# Patient Record
Sex: FEMALE | Race: WHITE
[De-identification: names, ages, dates, MRNs, and addresses within clinical notes are randomized per-mention and may not be internally consistent; named-entity substitution may affect disease eponyms.]

---

## 2017-09-27 ENCOUNTER — HOSPITAL ENCOUNTER (OUTPATIENT)
Dept: HOSPITAL 53 - M LAB REF | Age: 22
End: 2017-09-27
Attending: ADVANCED PRACTICE MIDWIFE
Payer: COMMERCIAL

## 2017-09-27 DIAGNOSIS — Z12.4: Primary | ICD-10-CM

## 2018-03-25 ENCOUNTER — HOSPITAL ENCOUNTER (OUTPATIENT)
Dept: HOSPITAL 53 - M WUC | Age: 23
End: 2018-03-25
Attending: NURSE PRACTITIONER
Payer: COMMERCIAL

## 2018-03-25 DIAGNOSIS — R53.83: Primary | ICD-10-CM

## 2018-03-25 DIAGNOSIS — F41.9: ICD-10-CM

## 2018-03-25 LAB
ALBUMIN/GLOBULIN RATIO: 1.33 (ref 1–1.93)
ALBUMIN: 4 GM/DL (ref 3.2–5.2)
ALKALINE PHOSPHATASE: 71 U/L (ref 45–117)
ALT SERPL W P-5'-P-CCNC: 15 U/L (ref 12–78)
ANION GAP: 6 MEQ/L (ref 8–16)
AST SERPL-CCNC: 7 U/L (ref 7–37)
BASO #: 0 10^3/UL (ref 0–0.2)
BASO %: 0.3 % (ref 0–1)
BILIRUBIN,TOTAL: 0.4 MG/DL (ref 0.2–1)
BLOOD UREA NITROGEN: 16 MG/DL (ref 7–18)
CALCIUM LEVEL: 8.6 MG/DL (ref 8.5–10.1)
CARBON DIOXIDE LEVEL: 29 MEQ/L (ref 21–32)
CHLORIDE LEVEL: 108 MEQ/L (ref 98–107)
CREATININE FOR GFR: 0.77 MG/DL (ref 0.55–1.3)
EOS #: 0 10^3/UL (ref 0–0.5)
EOSINOPHIL NFR BLD AUTO: 0.5 % (ref 0–3)
GFR SERPL CREATININE-BSD FRML MDRD: > 60 ML/MIN/{1.73_M2} (ref 60–?)
GLUCOSE, FASTING: 84 MG/DL (ref 70–100)
HEMATOCRIT: 39.2 % (ref 36–47)
HEMOGLOBIN: 12.8 G/DL (ref 12–16)
IMMATURE GRANULOCYTE %: 0.3 % (ref 0–3)
IRON (FE): 51 UG/DL (ref 50–170)
LYMPH #: 0.8 10^3/UL (ref 1.5–6.5)
LYMPH %: 21.4 % (ref 24–44)
MEAN CORPUSCULAR HEMOGLOBIN: 31.7 PG (ref 27–33)
MEAN CORPUSCULAR HGB CONC: 32.7 G/DL (ref 32–36.5)
MEAN CORPUSCULAR VOLUME: 97 FL (ref 80–96)
MONO #: 0.5 10^3/UL (ref 0–0.8)
MONO %: 12.2 % (ref 0–5)
NEUTROPHILS #: 2.5 10^3/UL (ref 1.8–7.7)
NEUTROPHILS %: 65.3 % (ref 36–66)
NRBC BLD AUTO-RTO: 0 % (ref 0–0)
PLATELET COUNT, AUTOMATED: 187 10^3/UL (ref 150–450)
POTASSIUM SERUM: 4.2 MEQ/L (ref 3.5–5.1)
RED BLOOD COUNT: 4.04 10^6/UL (ref 4–5.4)
RED CELL DISTRIBUTION WIDTH: 12 % (ref 11.5–14.5)
SODIUM LEVEL: 143 MEQ/L (ref 136–145)
TOTAL PROTEIN: 7 GM/DL (ref 6.4–8.2)
WHITE BLOOD COUNT: 3.8 10^3/UL (ref 4–10)

## 2018-03-25 PROCEDURE — 83540 ASSAY OF IRON: CPT

## 2018-03-26 LAB
25(OH)D3 SERPL-MCNC: 17.8 NG/ML (ref 30–100)
VITAMIN B12 LEVEL: 231 PG/ML (ref 247–911)

## 2018-10-01 ENCOUNTER — HOSPITAL ENCOUNTER (OUTPATIENT)
Dept: HOSPITAL 53 - M LAB REF | Age: 23
End: 2018-10-01
Attending: PHYSICIAN ASSISTANT
Payer: COMMERCIAL

## 2018-10-01 DIAGNOSIS — N39.0: Primary | ICD-10-CM

## 2018-10-01 LAB
AMORPH SED URNS QL MICRO: (no result)
APPEARANCE, URINE: (no result)
BACTERIA UR QL AUTO: (no result)
BILIRUBIN, URINE AUTO: NEGATIVE
BLOOD, URINE BLOOD: (no result)
GLUCOSE, URINE (UA) AUTO: NEGATIVE MG/DL
KETONE, URINE AUTO: NEGATIVE MG/DL
LEUKOCYTE ESTERASE UR QL STRIP.AUTO: (no result)
MUCUS, URINE: (no result)
NITRITE, URINE AUTO: NEGATIVE
PH,URINE: 5 UNITS (ref 5–9)
PROT UR QL STRIP.AUTO: (no result) MG/DL
RBC, URINE AUTO: 21 /HPF (ref 0–3)
SPECIFIC GRAVITY URINE AUTO: 1.02 (ref 1–1.03)
SQUAMOUS #/AREA URNS AUTO: 20 /HPF (ref 0–6)
UROBILINOGEN, URINE AUTO: 0.2 MG/DL (ref 0–2)
WBC, URINE AUTO: (no result) /HPF (ref 0–3)

## 2018-10-01 PROCEDURE — 81001 URINALYSIS AUTO W/SCOPE: CPT

## 2018-12-08 ENCOUNTER — HOSPITAL ENCOUNTER (OUTPATIENT)
Dept: HOSPITAL 53 - M WUC | Age: 23
End: 2018-12-08
Attending: NURSE PRACTITIONER
Payer: COMMERCIAL

## 2018-12-08 DIAGNOSIS — E55.9: ICD-10-CM

## 2018-12-08 DIAGNOSIS — F41.9: ICD-10-CM

## 2018-12-08 DIAGNOSIS — E61.1: Primary | ICD-10-CM

## 2018-12-08 DIAGNOSIS — D51.9: ICD-10-CM

## 2018-12-08 LAB
ALBUMIN/GLOBULIN RATIO: 1.16 (ref 1–1.93)
ALBUMIN: 3.7 GM/DL (ref 3.2–5.2)
ALKALINE PHOSPHATASE: 60 U/L (ref 45–117)
ALT SERPL W P-5'-P-CCNC: 22 U/L (ref 12–78)
ANION GAP: 8 MEQ/L (ref 8–16)
AST SERPL-CCNC: 11 U/L (ref 7–37)
BILIRUBIN,TOTAL: 0.5 MG/DL (ref 0.2–1)
BLOOD UREA NITROGEN: 9 MG/DL (ref 7–18)
CALCIUM LEVEL: 9.5 MG/DL (ref 8.5–10.1)
CARBON DIOXIDE LEVEL: 28 MEQ/L (ref 21–32)
CHLORIDE LEVEL: 105 MEQ/L (ref 98–107)
CREATININE FOR GFR: 0.78 MG/DL (ref 0.55–1.3)
GFR SERPL CREATININE-BSD FRML MDRD: > 60 ML/MIN/{1.73_M2} (ref 60–?)
GLUCOSE, FASTING: 100 MG/DL (ref 70–100)
IRON (FE): 37 UG/DL (ref 50–170)
POTASSIUM SERUM: 3.8 MEQ/L (ref 3.5–5.1)
SODIUM LEVEL: 141 MEQ/L (ref 136–145)
TOTAL PROTEIN: 6.9 GM/DL (ref 6.4–8.2)

## 2018-12-08 PROCEDURE — 83540 ASSAY OF IRON: CPT

## 2018-12-10 LAB
25(OH)D3 SERPL-MCNC: 14.9 NG/ML (ref 30–100)
VITAMIN B12 LEVEL: 246 PG/ML (ref 247–911)

## 2019-09-05 ENCOUNTER — HOSPITAL ENCOUNTER (OUTPATIENT)
Dept: HOSPITAL 53 - M WUC | Age: 24
End: 2019-09-05
Attending: FAMILY MEDICINE
Payer: COMMERCIAL

## 2019-09-05 DIAGNOSIS — D51.9: ICD-10-CM

## 2019-09-05 DIAGNOSIS — E55.9: Primary | ICD-10-CM

## 2019-09-05 DIAGNOSIS — E61.1: ICD-10-CM

## 2019-09-05 DIAGNOSIS — R63.5: ICD-10-CM

## 2019-09-05 LAB
25(OH)D3 SERPL-MCNC: 34.3 NG/ML (ref 30–100)
ALBUMIN SERPL BCG-MCNC: 3.8 GM/DL (ref 3.2–5.2)
ALT SERPL W P-5'-P-CCNC: 17 U/L (ref 12–78)
BILIRUB SERPL-MCNC: 0.4 MG/DL (ref 0.2–1)
BUN SERPL-MCNC: 14 MG/DL (ref 7–18)
CALCIUM SERPL-MCNC: 9.3 MG/DL (ref 8.5–10.1)
CHLORIDE SERPL-SCNC: 107 MEQ/L (ref 98–107)
CO2 SERPL-SCNC: 26 MEQ/L (ref 21–32)
CREAT SERPL-MCNC: 0.7 MG/DL (ref 0.55–1.3)
GFR SERPL CREATININE-BSD FRML MDRD: > 60 ML/MIN/{1.73_M2} (ref 60–?)
GLUCOSE SERPL-MCNC: 82 MG/DL (ref 70–100)
HCT VFR BLD AUTO: 38.9 % (ref 36–47)
HGB BLD-MCNC: 13.3 G/DL (ref 12–15.5)
IRON SATN MFR SERPL: 31.4 % (ref 13.2–45)
IRON SERPL-MCNC: 106 UG/DL (ref 50–170)
MCH RBC QN AUTO: 32.6 PG (ref 27–33)
MCHC RBC AUTO-ENTMCNC: 34.2 G/DL (ref 32–36.5)
MCV RBC AUTO: 95.3 FL (ref 80–96)
PLATELET # BLD AUTO: 227 10^3/UL (ref 150–450)
POTASSIUM SERPL-SCNC: 4 MEQ/L (ref 3.5–5.1)
PROT SERPL-MCNC: 6.8 GM/DL (ref 6.4–8.2)
RBC # BLD AUTO: 4.08 10^6/UL (ref 4–5.4)
SODIUM SERPL-SCNC: 140 MEQ/L (ref 136–145)
TIBC SERPL-MCNC: 338 UG/DL (ref 250–450)
TSH SERPL DL<=0.005 MIU/L-ACNC: 2.19 UIU/ML (ref 0.36–3.74)
VIT B12 SERPL-MCNC: 225 PG/ML (ref 247–911)
WBC # BLD AUTO: 4.7 10^3/UL (ref 4–10)

## 2019-10-11 ENCOUNTER — HOSPITAL ENCOUNTER (OUTPATIENT)
Dept: HOSPITAL 53 - M LAB REF | Age: 24
End: 2019-10-11
Attending: ADVANCED PRACTICE MIDWIFE
Payer: COMMERCIAL

## 2019-10-11 DIAGNOSIS — R87.610: Primary | ICD-10-CM

## 2019-10-11 PROCEDURE — 87624 HPV HI-RISK TYP POOLED RSLT: CPT

## 2019-10-15 LAB — HPV LOW VOL RFLX: (no result)

## 2019-11-15 ENCOUNTER — HOSPITAL ENCOUNTER (OUTPATIENT)
Dept: HOSPITAL 53 - M LAB REF | Age: 24
End: 2019-11-15
Payer: COMMERCIAL

## 2019-11-15 DIAGNOSIS — R87.820: ICD-10-CM

## 2019-11-15 DIAGNOSIS — R87.610: Primary | ICD-10-CM

## 2020-05-22 ENCOUNTER — HOSPITAL ENCOUNTER (OUTPATIENT)
Dept: HOSPITAL 53 - M SFHCWAGY | Age: 25
End: 2020-05-22
Attending: ADVANCED PRACTICE MIDWIFE
Payer: COMMERCIAL

## 2020-05-22 DIAGNOSIS — R87.612: Primary | ICD-10-CM

## 2020-05-22 PROCEDURE — 87624 HPV HI-RISK TYP POOLED RSLT: CPT

## 2021-02-25 ENCOUNTER — HOSPITAL ENCOUNTER (OUTPATIENT)
Dept: HOSPITAL 53 - M WUC | Age: 26
End: 2021-02-25
Attending: PHYSICIAN ASSISTANT
Payer: COMMERCIAL

## 2021-02-25 DIAGNOSIS — M25.571: Primary | ICD-10-CM

## 2021-02-25 LAB
CRP SERPL-MCNC: 0.53 MG/DL (ref 0–0.3)
ERYTHROCYTE [SEDIMENTATION RATE] IN BLOOD BY WESTERGREN METHOD: 10 MM/HR (ref 0–20)
HCT VFR BLD AUTO: 39.3 % (ref 36–47)
HGB BLD-MCNC: 13.1 G/DL (ref 12–15.5)
MCH RBC QN AUTO: 32.2 PG (ref 27–33)
MCHC RBC AUTO-ENTMCNC: 33.3 G/DL (ref 32–36.5)
MCV RBC AUTO: 96.6 FL (ref 80–96)
PLATELET # BLD AUTO: 256 10^3/UL (ref 150–450)
RBC # BLD AUTO: 4.07 10^6/UL (ref 4–5.4)
RHEUMATOID FACT SERPL-ACNC: < 10 IU/ML (ref ?–15)
URATE SERPL-MCNC: 5.1 MG/DL (ref 2.6–6)
WBC # BLD AUTO: 5.2 10^3/UL (ref 4–10)

## 2021-04-08 ENCOUNTER — HOSPITAL ENCOUNTER (OUTPATIENT)
Dept: HOSPITAL 53 - M PLARAD | Age: 26
End: 2021-04-08
Attending: PHYSICIAN ASSISTANT
Payer: COMMERCIAL

## 2021-04-08 DIAGNOSIS — M89.272: Primary | ICD-10-CM

## 2021-04-08 NOTE — REP
INDICATION:

RT FOOT LESION ?  MALIGNANT.



COMPARISON:

MRI 01/05/2021.



TECHNIQUE:

Multiple sequences obtained in the axial, coronal and sagittal planes prior to and

following the intravenous administration of 15 mL ProHance.



FINDINGS:

Once again in the distal 1st metatarsal there is ill-defined low signal in the head of

the metatarsal predominantly laterally on both T1 and T2.  In the neck of the

metatarsal there is focal oval somewhat irregular central low signal on T1 and high

signal on T2.  On the cross section images axially through the neck of the metatarsal,

comparing the pre and postcontrast images, there appears to be slight enhancement of

the lesion only in the neck of the metatarsal.  The margins of this portion of the

lesion are sharply demarcated, with a sharp zone of transition.  There is no cortical

destruction.  There is no associated soft tissue mass.  There is no enhancement in the

head of the 1st metatarsal.  Given these findings I favor that this represents a

benign lesion.  No other abnormal bone marrow signal is seen.  Tendinous and

ligamentous structures of the foot appear unremarkable.  There is no ganglion cyst or

fluid collection.  No joint effusion is seen.  Sinus tarsi is unremarkable.



IMPRESSION:

The lesion in the distal 1st metatarsal demonstrates signal and enhancement

characteristics as discussed above which I feel are most consistent with a benign

lesion.  Recommend correlation with plain films.





<Electronically signed by Dae King > 04/08/21 [id number]

## 2021-05-04 ENCOUNTER — HOSPITAL ENCOUNTER (OUTPATIENT)
Dept: HOSPITAL 53 - M LAB REF | Age: 26
End: 2021-05-04
Attending: PHYSICIAN ASSISTANT
Payer: COMMERCIAL

## 2021-05-04 DIAGNOSIS — J02.9: Primary | ICD-10-CM

## 2021-06-14 ENCOUNTER — HOSPITAL ENCOUNTER (OUTPATIENT)
Dept: HOSPITAL 53 - M SFHCWAGY | Age: 26
End: 2021-06-14
Attending: ADVANCED PRACTICE MIDWIFE
Payer: COMMERCIAL

## 2021-06-14 DIAGNOSIS — Z12.4: Primary | ICD-10-CM

## 2022-04-06 ENCOUNTER — HOSPITAL ENCOUNTER (OUTPATIENT)
Dept: HOSPITAL 53 - M WUC | Age: 27
End: 2022-04-06
Attending: ALLERGY & IMMUNOLOGY
Payer: COMMERCIAL

## 2022-04-06 DIAGNOSIS — L50.1: Primary | ICD-10-CM

## 2022-04-06 LAB
ALBUMIN SERPL BCG-MCNC: 3.9 GM/DL (ref 3.2–5.2)
ALT SERPL W P-5'-P-CCNC: 19 U/L (ref 12–78)
BASOPHILS # BLD AUTO: 0 10^3/UL (ref 0–0.2)
BASOPHILS NFR BLD AUTO: 0.2 % (ref 0–1)
BILIRUB SERPL-MCNC: 0.5 MG/DL (ref 0.2–1)
BUN SERPL-MCNC: 17 MG/DL (ref 7–18)
CALCIUM SERPL-MCNC: 8.8 MG/DL (ref 8.5–10.1)
CHLORIDE SERPL-SCNC: 114 MEQ/L (ref 98–107)
CO2 SERPL-SCNC: 26 MEQ/L (ref 21–32)
CREAT SERPL-MCNC: 0.85 MG/DL (ref 0.55–1.3)
EOSINOPHIL # BLD AUTO: 0 10^3/UL (ref 0–0.5)
EOSINOPHIL NFR BLD AUTO: 0.5 % (ref 0–3)
ERYTHROCYTE [SEDIMENTATION RATE] IN BLOOD BY WESTERGREN METHOD: 6 MM/HR (ref 0–20)
GFR SERPL CREATININE-BSD FRML MDRD: > 60 ML/MIN/{1.73_M2} (ref 60–?)
GLUCOSE SERPL-MCNC: 81 MG/DL (ref 70–100)
HCT VFR BLD AUTO: 38.2 % (ref 36–47)
HGB BLD-MCNC: 12.9 G/DL (ref 12–15.5)
LYMPHOCYTES # BLD AUTO: 1.6 10^3/UL (ref 1.5–5)
LYMPHOCYTES NFR BLD AUTO: 37.9 % (ref 24–44)
MCH RBC QN AUTO: 32.7 PG (ref 27–33)
MCHC RBC AUTO-ENTMCNC: 33.8 G/DL (ref 32–36.5)
MCV RBC AUTO: 96.7 FL (ref 80–96)
MONOCYTES # BLD AUTO: 0.3 10^3/UL (ref 0–0.8)
MONOCYTES NFR BLD AUTO: 6.1 % (ref 2–8)
NEUTROPHILS # BLD AUTO: 2.3 10^3/UL (ref 1.5–8.5)
NEUTROPHILS NFR BLD AUTO: 55.1 % (ref 36–66)
PLATELET # BLD AUTO: 233 10^3/UL (ref 150–450)
POTASSIUM SERPL-SCNC: 3.9 MEQ/L (ref 3.5–5.1)
PROT SERPL-MCNC: 6.7 GM/DL (ref 6.4–8.2)
RBC # BLD AUTO: 3.95 10^6/UL (ref 4–5.4)
RHEUMATOID FACT SERPL-ACNC: < 10 IU/ML (ref ?–15)
SODIUM SERPL-SCNC: 144 MEQ/L (ref 136–145)
T3 SERPL-MCNC: 164.6 NG/DL (ref 60–181)
T4 SERPL-MCNC: 12.6 UG/DL (ref 4.5–12)
THYROPEROXIDASE AB SERPL IA-ACNC: 32.7 U/ML (ref ?–60)
TSH SERPL DL<=0.005 MIU/L-ACNC: 1.35 UIU/ML (ref 0.36–3.74)
WBC # BLD AUTO: 4.3 10^3/UL (ref 4–10)

## 2022-04-12 ENCOUNTER — HOSPITAL ENCOUNTER (OUTPATIENT)
Dept: HOSPITAL 53 - M WUC | Age: 27
End: 2022-04-12
Attending: FAMILY MEDICINE
Payer: COMMERCIAL

## 2022-04-12 DIAGNOSIS — E55.9: Primary | ICD-10-CM

## 2022-04-12 DIAGNOSIS — D51.9: ICD-10-CM

## 2022-04-12 LAB
BASOPHILS # BLD AUTO: 0 10^3/UL (ref 0–0.2)
BASOPHILS NFR BLD AUTO: 0.2 % (ref 0–1)
EOSINOPHIL # BLD AUTO: 0 10^3/UL (ref 0–0.5)
EOSINOPHIL NFR BLD AUTO: 0.8 % (ref 0–3)
HCT VFR BLD AUTO: 38.1 % (ref 36–47)
HGB BLD-MCNC: 13.1 G/DL (ref 12–15.5)
LYMPHOCYTES # BLD AUTO: 1.8 10^3/UL (ref 1.5–5)
LYMPHOCYTES NFR BLD AUTO: 37.4 % (ref 24–44)
MCH RBC QN AUTO: 32.8 PG (ref 27–33)
MCHC RBC AUTO-ENTMCNC: 34.4 G/DL (ref 32–36.5)
MCV RBC AUTO: 95.3 FL (ref 80–96)
MONOCYTES # BLD AUTO: 0.3 10^3/UL (ref 0–0.8)
MONOCYTES NFR BLD AUTO: 7 % (ref 2–8)
NEUTROPHILS # BLD AUTO: 2.6 10^3/UL (ref 1.5–8.5)
NEUTROPHILS NFR BLD AUTO: 54.4 % (ref 36–66)
PLATELET # BLD AUTO: 245 10^3/UL (ref 150–450)
RBC # BLD AUTO: 4 10^6/UL (ref 4–5.4)
WBC # BLD AUTO: 4.8 10^3/UL (ref 4–10)

## 2022-04-13 LAB
25(OH)D3 SERPL-MCNC: 18.8 NG/ML (ref 30–100)
VIT B12 SERPL-MCNC: 257 PG/ML (ref 247–911)

## 2022-05-18 ENCOUNTER — HOSPITAL ENCOUNTER (OUTPATIENT)
Dept: HOSPITAL 53 - M RAD | Age: 27
End: 2022-05-18
Attending: PHYSICIAN ASSISTANT
Payer: COMMERCIAL

## 2022-05-18 DIAGNOSIS — M84.378A: Primary | ICD-10-CM

## 2022-05-18 PROCEDURE — 73720 MRI LWR EXTREMITY W/O&W/DYE: CPT

## 2022-05-25 ENCOUNTER — HOSPITAL ENCOUNTER (OUTPATIENT)
Dept: HOSPITAL 53 - M WUC | Age: 27
End: 2022-05-25
Attending: PHYSICIAN ASSISTANT
Payer: COMMERCIAL

## 2022-05-25 DIAGNOSIS — J02.9: Primary | ICD-10-CM
